# Patient Record
Sex: FEMALE | Race: WHITE | NOT HISPANIC OR LATINO
[De-identification: names, ages, dates, MRNs, and addresses within clinical notes are randomized per-mention and may not be internally consistent; named-entity substitution may affect disease eponyms.]

---

## 2019-04-11 PROBLEM — Z00.00 ENCOUNTER FOR PREVENTIVE HEALTH EXAMINATION: Status: ACTIVE | Noted: 2019-04-11

## 2019-04-15 ENCOUNTER — APPOINTMENT (OUTPATIENT)
Dept: NEUROLOGY | Facility: CLINIC | Age: 54
End: 2019-04-15
Payer: COMMERCIAL

## 2019-04-15 VITALS
HEIGHT: 66 IN | DIASTOLIC BLOOD PRESSURE: 76 MMHG | BODY MASS INDEX: 22.34 KG/M2 | SYSTOLIC BLOOD PRESSURE: 117 MMHG | WEIGHT: 139 LBS | HEART RATE: 76 BPM

## 2019-04-15 PROCEDURE — 99204 OFFICE O/P NEW MOD 45 MIN: CPT

## 2019-04-16 NOTE — DISCUSSION/SUMMARY
[FreeTextEntry1] : Patient with right orbicularis muscle contractions that appear wavelike that started in the setting of sinus infection that has persisted for the last 2 weeks. Possible myokymia related to stress of the infection and fatigue. If it does not resolve in the next week as her infection fully dissipates, will obtain brain MRI to r/o brainstem pathology and consider EMG to assess facial nerve. She will call me in a week to f/u.

## 2019-04-16 NOTE — PHYSICAL EXAM
[2+] : Patella left 2+ [FreeTextEntry1] : There is involuntary, rippling movements under her right eye in the inferior area of orbicularis oculi. EOMI. There are no eyelid twitches or facial spasms. These undulating movements comes and goes with limited spread. Face is symmetric. There is no bradykinesia, tremor of chorea in her limbs. Gait is normal

## 2019-04-16 NOTE — HISTORY OF PRESENT ILLNESS
[FreeTextEntry1] : Patient has no PMH and says that she had a URI with severe congestion that lasted 2 weeks and is now resolving. During this time, she felt pain and pressure in her temples and started feeling a constant pulsation under her right eye that started at the end of her URI. It is constantly there and does not fluctuate.  She is not aware of it when asleep. She does not have any involuntary eye closure. it does not spread to her lower face, but bothers her during the day. Denies any tinnitus or hearing loss. There are no other involuntary movements in her limbs. Her gait and balance are normal.

## 2021-12-18 ENCOUNTER — EMERGENCY (EMERGENCY)
Facility: HOSPITAL | Age: 56
LOS: 1 days | Discharge: HOME | End: 2021-12-18
Attending: INTERNAL MEDICINE | Admitting: INTERNAL MEDICINE
Payer: COMMERCIAL

## 2021-12-18 VITALS
TEMPERATURE: 98 F | SYSTOLIC BLOOD PRESSURE: 115 MMHG | DIASTOLIC BLOOD PRESSURE: 74 MMHG | OXYGEN SATURATION: 98 % | RESPIRATION RATE: 16 BRPM | HEART RATE: 61 BPM | HEIGHT: 66 IN | WEIGHT: 154.98 LBS

## 2021-12-18 PROCEDURE — 73140 X-RAY EXAM OF FINGER(S): CPT

## 2021-12-18 PROCEDURE — 99284 EMERGENCY DEPT VISIT MOD MDM: CPT | Mod: 25

## 2021-12-18 PROCEDURE — 99283 EMERGENCY DEPT VISIT LOW MDM: CPT | Mod: 25

## 2021-12-18 PROCEDURE — 29130 APPL FINGER SPLINT STATIC: CPT

## 2021-12-18 PROCEDURE — 73140 X-RAY EXAM OF FINGER(S): CPT | Mod: 26,LT

## 2021-12-18 RX ORDER — IBUPROFEN 200 MG
600 TABLET ORAL ONCE
Refills: 0 | Status: COMPLETED | OUTPATIENT
Start: 2021-12-18 | End: 2021-12-18

## 2021-12-18 RX ADMIN — Medication 600 MILLIGRAM(S): at 14:44

## 2021-12-18 NOTE — ED PROVIDER NOTE - CARE PROVIDER_API CALL
Norm Rolon)  Orthopaedic Sports Medicine; Orthopaedic Surgery  [address] [address]  Phone: [phone]  Fax: [phone]  Follow Up Time:

## 2021-12-18 NOTE — ED PROVIDER NOTE - UPPER EXTREMITY EXAM, LEFT
Left dorsal 3rd finger with swelling and tenderness of distal phalynx, no PIP tenderness, no deformitry or instability, good extension and flexion strength, sensation intact./SWELLING/TENDERNESS

## 2021-12-18 NOTE — ED PROVIDER NOTE - CARE PLAN
1 Principal Discharge DX:	Closed fracture of tuft of distal phalanx of finger  Secondary Diagnosis:	Injury, crush, finger  Secondary Diagnosis:	Minor abrasion

## 2021-12-18 NOTE — ED PROVIDER NOTE - OBJECTIVE STATEMENT
55 yo female with no significant medical history, right-hand dominant, complaining of left 3rd finger pain after slamming it in a car door just prior to presentation in the ED. 55 yo female with no significant medical history, right-hand dominant, complaining of left 3rd finger pain after slamming it in a car door just prior to presentation in the ED. Patient denies any other injury, no numbness or tingling, no weakness.  Patient confirms having received a tetanus vaccine within the last 5 years when asked. Patient reports mild throbbing pain, worse with movement of finger.

## 2021-12-18 NOTE — ED PROVIDER NOTE - ATTENDING CONTRIBUTION TO CARE
55 yo female with no significant medical history, right-hand dominant, complaining of left 3rd finger pain after slamming it in a car door just prior to presentation in the ED. Patient denies any other injury, no numbness or tingling, no weakness.  Patient confirms having received a tetanus vaccine within the last 5 years when asked. Patient reports mild throbbing pain, worse with movement of finger.    tetanus UTD, FROM, neurovascularly intact, superficial abrasion/laceration noted, nail intact, no nailbed involvement, no subungual hematoma.    Xray: Tuft Fracture, closed.   splint, nsaids, ortho follow up.  Dr. Rodriguez:  I have reviewed and discussed with the PA/ resident the case specifics, including the history, physical assessment, evaluation, conclusion, laboratory results, and medical plan. I agree with the contents, and conclusions. I have personally examined, and interviewed the patient.

## 2021-12-18 NOTE — ED PROVIDER NOTE - SKIN WOUND TYPE
Ecchymosis noted dorsum of left 3rd finger on distal phalanx, no subungual hematoma, nail intact, skin with superficial laceration and abrasion./ABRASION(S)/BRUSING/LACERATION(S)

## 2021-12-18 NOTE — ED PROCEDURE NOTE - PROCEDURE ADDITIONAL DETAILS
Abrasion and superficial laceration cleaned, mastisol and steri-strip applied, non-adhesive bandage, prior to application of finger spint.

## 2021-12-18 NOTE — ED PROVIDER NOTE - NSFOLLOWUPINSTRUCTIONS_ED_ALL_ED_FT
Follow up with your doctor. Follow up with an orthopedist as directed/needed.  elevate as much as possible.    Ice not more than 20 minutes every hour to the finger.      Finger Fracture, Adult      A finger fracture is a break in any of the bones in your fingers. Your doctor may put a splint on your finger so it will not move while it heals (immobilization).      Follow these instructions at home:      If you have a splint:      •Wear the splint as told by your doctor. Remove it only as told by your doctor.      •Do not put pressure on any part of the splint until it is fully hardened. This may take several hours.      •Loosen the splint if your fingers tingle, lose feeling (get numb), or turn cold and blue.      •Keep the splint clean.    •If the splint is not waterproof:  •Do not let it get wet.      •Cover it with a watertight covering when you take a bath or a shower.        •Ask your doctor when it is safe for you to drive.      Managing pain, stiffness, and swelling   •If directed, put ice on the injured area:  •If you have a removable splint, remove it as told by your doctor.      •Put ice in a plastic bag.      •Place a towel between your skin and the bag.      •Leave the ice on for 20 minutes, 2–3 times a day.        •Move your fingers often to avoid stiffness and to lessen swelling.      •Raise (elevate) the injured area above the level of your heart while you are sitting or lying down.      Activity     • Do not drive or use heavy machinery while taking prescription pain medicine.      •Do exercises (physical therapy) as told by your doctor.      •Return to your normal activities as told by your doctor. Ask your doctor what activities are safe for you.      General instructions     • Do not use any products that have nicotine or tobacco in them, such as cigarettes and e-cigarettes. These can delay bone healing. If you need help quitting, ask your doctor.      •Take over-the-counter and prescription medicines only as told by your doctor.      •Keep all follow-up visits as told by your doctor. This is important.        Contact a doctor if:    •Your pain or swelling gets worse, even with treatment.      •You have trouble moving your finger.        Get help right away if:    •Your finger gets numb or turns blue.        Summary    •A finger fracture is a break in any of the bones in your fingers.      •You may need to wear a splint on your finger so it will not move while it heals (immobilization).      •If directed, put ice on the injured area for 20 minutes, 2–3 times a day.      This information is not intended to replace advice given to you by your health care provider. Make sure you discuss any questions you have with your health care provider.      Crush Injury    WHAT YOU NEED TO KNOW:    A crush injury happens when part of your body is trapped under a heavy object, or trapped between objects. You may have one or more broken bones. You may also have tissue damage. The damage can cause pain, numbness, and weakness. A crush injury can cause serious problems that need immediate treatment.    DISCHARGE INSTRUCTIONS:    Medicines: You may need any of the following:   •Prescription pain medicine may be given. Ask how to take this medicine safely.      •Antibiotics prevent or fight a bacterial infection.      •Take your medicine as directed. Contact your healthcare provider if you think your medicine is not helping or if you have side effects. Tell him of her if you are allergic to any medicine. Keep a list of the medicines, vitamins, and herbs you take. Include the amounts, and when and why you take them. Bring the list or the pill bottles to follow-up visits. Carry your medicine list with you in case of an emergency.      Call 911 for any of the following:   •You have chest pain, shortness of breath, or cannot think clearly.          Return to the emergency department if:   •The skin near the injured area turns blue or white or feels cold and numb.      •You feel pain that increases when you stretch or bend the injured area.      •The injured area swells or feels tight or hard.      •You have pale or shiny skin near your injury.      •You have numbness or trouble moving your injured arm or leg.      •Your wound is draining pus or smells bad.      •Your pain or swelling does not go away or gets worse, even after you take medicine.      •Blood soaks through your bandage or cast.      Contact your healthcare provider if:   •You have questions or concerns about your condition or care.          Follow up with your healthcare provider as directed: You may need more x-rays, or a cast for a broken bone. You may also need treatment for muscle, nerve, or kidney damage. Your healthcare provider may refer you to an orthopedic surgeon or other specialist. Write down your questions so you remember to ask them during your visits.    Apply ice: Ice helps decrease pain and swelling. Ice may also help decrease tissue damage. Use an ice pack, or put crushed ice in a plastic bag. Cover it with a towel. Apply it to the injured area for 20 minutes every hour, or as directed. Ask your healthcare provider how many times each day to apply ice, and for how many days.    Elevate the injured area as directed: If possible, raise the area as often as you can. This will help decrease swelling and pain. Prop it on pillows to keep it elevated comfortably.     Do not smoke: Smoking can cause tissue damage and delay healing. Ask your healthcare provider for more information if you currently smoke and need help quitting.    Go to therapy as directed: A physical therapist can teach you exercises to help improve movement and strength. Physical therapy can also help decrease pain and loss of function. An occupational therapist can help you find ways to do daily activities and care for yourself.

## 2021-12-18 NOTE — ED PROVIDER NOTE - CLINICAL SUMMARY MEDICAL DECISION MAKING FREE TEXT BOX
55 yo female with no significant medical history, right-hand dominant, complaining of left 3rd finger pain after slamming it in a car door just prior to presentation in the ED.    tetanus UTD, FROM, neurovascularly intact, superficial abrasion/laceration noted, nail intact, no nailbed involvement, no subungual hematoma.    Xray, splint, ortho follow up. 57 yo female with no significant medical history, right-hand dominant, complaining of left 3rd finger pain after slamming it in a car door just prior to presentation in the ED.    tetanus UTD, FROM, neurovascularly intact, superficial abrasion/laceration noted, nail intact, no nailbed involvement, no subungual hematoma.    Xray: Tuft Fracture, closed.   splint, nsaids, ortho follow up. 57 yo female with no significant medical history, right-hand dominant, complaining of left 3rd finger pain after slamming it in a car door just prior to presentation in the ED. Patient denies any other injury, no numbness or tingling, no weakness.  Patient confirms having received a tetanus vaccine within the last 5 years when asked. Patient reports mild throbbing pain, worse with movement of finger.    tetanus UTD, FROM, neurovascularly intact, superficial abrasion/laceration noted, nail intact, no nailbed involvement, no subungual hematoma.    Xray: Tuft Fracture, closed.   splint, nsaids, ortho follow up.

## 2021-12-18 NOTE — ED PROVIDER NOTE - PATIENT PORTAL LINK FT
You can access the FollowMyHealth Patient Portal offered by [facility] by registering at the following website: [email or web address]. By joining [employer]’s FollowMyHealth portal, you will also be able to view your health information using other applications (apps) compatible with our system.

## 2021-12-18 NOTE — ED ADULT NURSE NOTE - OBJECTIVE STATEMENT
Pt reports closing finger in car door. Presents with laceration to left third finger. Bleeding at site

## 2021-12-29 ENCOUNTER — EMERGENCY (EMERGENCY)
Facility: HOSPITAL | Age: 56
LOS: 1 days | Discharge: HOME | End: 2021-12-29
Attending: INTERNAL MEDICINE | Admitting: INTERNAL MEDICINE
Payer: COMMERCIAL

## 2021-12-29 VITALS
WEIGHT: 149.91 LBS | RESPIRATION RATE: 18 BRPM | TEMPERATURE: 98 F | OXYGEN SATURATION: 98 % | HEIGHT: 66 IN | HEART RATE: 66 BPM | DIASTOLIC BLOOD PRESSURE: 78 MMHG | SYSTOLIC BLOOD PRESSURE: 124 MMHG

## 2021-12-29 PROCEDURE — 99283 EMERGENCY DEPT VISIT LOW MDM: CPT

## 2021-12-29 RX ADMIN — Medication 1 TABLET(S): at 21:37

## 2021-12-29 NOTE — ED ADULT TRIAGE NOTE - CHIEF COMPLAINT QUOTE
Patient presents to ED from home complaining of left middle finger pain, redness, & swelling. Patient states she injured her finger in a car door on 12/17 and was treated here in the ED. Patient is still wearing a splint at this time, scab noted on tip of finger, redness & swelling also noted. Patient was to ensure that it is not infected. Denies fever or chills.

## 2021-12-29 NOTE — ED PROVIDER NOTE - CARE PROVIDER_API CALL
Adonis Johansen)  Plastic Surgery  [address] [place] [address], [address] [place] [id number]  Phone: [phone]  Fax: [phone]  Follow Up Time:

## 2021-12-29 NOTE — ED PROVIDER NOTE - ATTENDING CONTRIBUTION TO CARE
56 yr old female with no pmhx with recent fx of distal left 3rd finger presents with left third finger pain, redness and swelling. Pt states she injured her finger in a car door on 12/17 and was treated here in the ED, xray showing fx and was sent home with hand follow up. Pt has not seen hand yet, but reports redness and swelling. Pt was not given abx at the time.  splint in place. Pt denies any fever or chills. right hand dominant.  left 3rd finger- splint in place, abrasion noted to distal nail bed, positive ROM, positive radial pulse, less than 2 sec cap refill, slight redness and swelling noted to distal finger.    will treat with augmentin, wound care. pt stable for dc and fu with hand sx.  Dr. Rodriguez:  I have reviewed and discussed with the PA/ resident the case specifics, including the history, physical assessment, evaluation, conclusion, laboratory results, and medical plan. I agree with the contents, and conclusions. I have personally examined, and interviewed the patient.

## 2021-12-29 NOTE — ED ADULT NURSE NOTE - OBJECTIVE STATEMENT
Patient c/o left 3rd fingertip pain and swelling s/p fx finger on 12/18/2021. Redness and swelling noted to fingertip. +cap refill <3 sec and + radial pulse noted.

## 2021-12-29 NOTE — ED PROVIDER NOTE - CLINICAL SUMMARY MEDICAL DECISION MAKING FREE TEXT BOX
56 yr old female with no pmhx with recent fx of distal left 3rd finger presents with left third finger pain, redness and swelling. Pt states she injured her finger in a car door on 12/17 and was treated here in the ED, xray showing fx and was sent home with hand follow up. Pt has not seen hand yet, but reports redness and swelling. Pt was not given abx at the time.  splint in place. Pt denies any fever or chills. right hand dominant.  left 3rd finger- splint in place, abrasion noted to distal nail bed, positive ROM, positive radial pulse, less than 2 sec cap refill, slight redness and swelling noted to distal finger.    will treat with augmentin, wound care. pt stable for dc and fu with hand sx.

## 2021-12-29 NOTE — ED PROVIDER NOTE - PATIENT PORTAL LINK FT
You can access the FollowMyHealth Patient Portal offered by [facility] by registering at the following website: [email or web address]. By joining [employer]’s FollowMyHealth portal, you will also be able to view your health information using other applications (apps) compatible with our system.

## 2021-12-29 NOTE — ED PROVIDER NOTE - NSFOLLOWUPINSTRUCTIONS_ED_ALL_ED_FT
Rest, elevate.   use splint for support   Take motrin 600mg every 6 hours as needed for pain   Clean area with soap and water twice daily   Take Augmentin twice daily.   Follow up with hand surgeon this week.

## 2021-12-29 NOTE — ED PROVIDER NOTE - PHYSICAL EXAMINATION
left 3rd finger- splint in place, abrasion noted to distal nail bed, positive ROM, positive radial pulse, less than 2 sec cap refill, slight redness and swelling noted to distal finger.

## 2021-12-29 NOTE — ED PROVIDER NOTE - OBJECTIVE STATEMENT
56 yr old female with no pmhx with recent fx of distal left 3rd finger presents with left third finger pain, redness and swelling. Pt states she injured her finger in a car door on 12/17 and was treated here in the ED, xray showing fx and was sent home with hand follow up. Pt has not seen hand yet, but reports redness and swelling. Pt was not given abx at the time.  splint in place. Pt denies any fever or chills. right hand dominant.

## 2021-12-30 PROBLEM — Z78.9 OTHER SPECIFIED HEALTH STATUS: Chronic | Status: ACTIVE | Noted: 2021-12-19

## 2022-05-25 ENCOUNTER — APPOINTMENT (OUTPATIENT)
Dept: OBGYN | Facility: CLINIC | Age: 57
End: 2022-05-25